# Patient Record
(demographics unavailable — no encounter records)

---

## 2024-12-03 NOTE — PHYSICAL EXAM
[No Acute Distress] : no acute distress [Well Nourished] : well nourished [Well Developed] : well developed [PERRL] : pupils equal round and reactive to light [Thyroid Normal, No Nodules] : the thyroid was normal and there were no nodules present [No Respiratory Distress] : no respiratory distress  [No Accessory Muscle Use] : no accessory muscle use [Clear to Auscultation] : lungs were clear to auscultation bilaterally [Normal Rate] : normal rate  [Regular Rhythm] : with a regular rhythm [Normal S1, S2] : normal S1 and S2 [Soft] : abdomen soft [Grossly Normal Strength/Tone] : grossly normal strength/tone [No Rash] : no rash [Coordination Grossly Intact] : coordination grossly intact [Normal Insight/Judgement] : insight and judgment were intact

## 2024-12-03 NOTE — REVIEW OF SYSTEMS
[Negative] : Heme/Lymph [Headache] : no headache [Dizziness] : dizziness [Fainting] : no fainting [Confusion] : no confusion [Memory Loss] : memory loss [Unsteady Walking] : no ataxia [de-identified] : LE swelling/tingling [de-identified] : forgetulness

## 2024-12-03 NOTE — HEALTH RISK ASSESSMENT
[Very Good] : ~his/her~ current health as very good [Good] : ~his/her~  mood as  good [No] : No [No falls in past year] : Patient reported no falls in the past year [0] : 2) Feeling down, depressed, or hopeless: Not at all (0) [PHQ-2 Negative - No further assessment needed] : PHQ-2 Negative - No further assessment needed [Never] : Never [Patient reported mammogram was normal] : Patient reported mammogram was normal [Patient reported PAP Smear was normal] : Patient reported PAP Smear was normal [None] : None [With Family] : lives with family [Employed] : employed [] :  [# Of Children ___] : has [unfilled] children [Fully functional (bathing, dressing, toileting, transferring, walking, feeding)] : Fully functional (bathing, dressing, toileting, transferring, walking, feeding) [Fully functional (using the telephone, shopping, preparing meals, housekeeping, doing laundry, using] : Fully functional and needs no help or supervision to perform IADLs (using the telephone, shopping, preparing meals, housekeeping, doing laundry, using transportation, managing medications and managing finances) [[id number]] : 0 [Change in mental status noted] : No change in mental status noted [Sexually Active] : not sexually active [Reports changes in hearing] : Reports no changes in hearing [Reports changes in vision] : Reports no changes in vision [MammogramComments] : dense breast tissue 7/23 mammo [PapSmearDate] : 11/23 [ColonoscopyComments] : January upcoming colonscopy

## 2024-12-03 NOTE — HISTORY OF PRESENT ILLNESS
[FreeTextEntry1] : annual [de-identified] : Patient is a 46 year old female who presents for their annual wellness visit. Patient has no complaints of: fever, chills, dizziness, headache, chest pain, palpitations, SOB, N/V/D, urinary symptoms, vomiting/diarrhea.  Patient will intermittently feel heaviness in her legs and has a feeling of pins and needles in her feel. This has been happening about 1x per week with the tingling occuring about once a month. Also has occasional brief episodes of dizziness/vertigo and increased forgetfulness.

## 2024-12-09 NOTE — PHYSICAL EXAM
[Chaperone Present] : A chaperone was present in the examining room during all aspects of the physical examination [[id number]] : A chaperone was present during the pelvic exam. [FreeTextEntry2] : Yudi Henderson [Appropriately responsive] : appropriately responsive [Alert] : alert [No Acute Distress] : no acute distress [No Lymphadenopathy] : no lymphadenopathy [Regular Rate Rhythm] : regular rate rhythm [No Murmurs] : no murmurs [Clear to Auscultation B/L] : clear to auscultation bilaterally [Soft] : soft [Non-tender] : non-tender [Non-distended] : non-distended [No HSM] : No HSM [No Lesions] : no lesions [No Mass] : no mass [Oriented x3] : oriented x3 [Examination Of The Breasts] : a normal appearance [No Masses] : no breast masses were palpable [Labia Majora] : normal [Labia Minora] : normal [Normal] : normal [Uterine Adnexae] : normal

## 2024-12-09 NOTE — PLAN
[FreeTextEntry1] : Examination Pap smear was done. Patient to maintain menstrual calendar. Perimenopause discussed with the patient. Prescription given for mammogram and sonogram Recommended to have transvaginal sonogram.

## 2024-12-09 NOTE — HISTORY OF PRESENT ILLNESS
[Mammogramdate] : 2023 [PapSmeardate] : 2023 [ColonoscopyDate] : 2021 [Regular Cycle Intervals] : periods have been regular [FreeTextEntry1] : 1 week. ago

## 2025-03-25 NOTE — HISTORY OF PRESENT ILLNESS
[FreeTextEntry1] : Follow up  [de-identified] : Patient has had ongoing pressure in her RUQ. Colonoscopy in January - normal per patient. Last week patient felt increased pressure/cramping pain which wrapped around her lower ribcage. She states that it was so uncomfortable that it interrupted her sleep. The pain was deep in nature and did not feel MSK. The pain was consistent for 3-4 days and is now intermittent. She has been focusing on hydrating. This has been ongoing for a few years. Denies change in bowel habits, no fever, N/V/D. Does not feel like the kidney stone she passed in the past. Denies pain or burning with urination.   B/L shoulder pain. Discomfort tinglig and limited ROM. Neck pain with tingling down B/L arms.

## 2025-03-25 NOTE — REVIEW OF SYSTEMS
[Abdominal Pain] : abdominal pain [Nausea] : no nausea [Constipation] : no constipation [Diarrhea] : diarrhea [Vomiting] : no vomiting [Heartburn] : no heartburn [Melena] : no melena [Joint Pain] : joint pain [Negative] : Neurological [FreeTextEntry9] : B/L shoulder pain

## 2025-03-25 NOTE — PHYSICAL EXAM
[No Acute Distress] : no acute distress [Well Nourished] : well nourished [No Respiratory Distress] : no respiratory distress  [No Accessory Muscle Use] : no accessory muscle use [Clear to Auscultation] : lungs were clear to auscultation bilaterally [Normal Rate] : normal rate  [Regular Rhythm] : with a regular rhythm [Normal S1, S2] : normal S1 and S2 [No Edema] : there was no peripheral edema [Soft] : abdomen soft [Non Tender] : non-tender [Non-distended] : non-distended [Normal Bowel Sounds] : normal bowel sounds [No CVA Tenderness] : no CVA  tenderness [Coordination Grossly Intact] : coordination grossly intact [Normal Affect] : the affect was normal [Normal Insight/Judgement] : insight and judgment were intact

## 2025-04-14 NOTE — HISTORY OF PRESENT ILLNESS
[de-identified] : 4/14/25: Date of Injury/Onset: 5 months  Pain: At Rest: 2/10 With Activity: 7/10 Mechanism of injury: Here for b/l shoulder pain x 1-year, worsening symptoms of numbness/tingling x 2 weeks. reports recreated ROM.  Pain in b/l shoulders radiates down arms to hands with N/T into 3rd & 4th digit, 7/10, intermittent, achy and sharp at times.   Worse at night when sleeping. pain awakes patient from sleep. some releif with rest.   Reports hx of RTC tear in left shoulder treated with PT.  denies any medical Hx.  This is NOT a Work Related Injury being treated under Worker's Compensation. This is NOT an athletic injury occurring associated with an interscholastic or organized sports team. Quality of symptoms: ache, radiating Improves with: rest Worse with: ROM Prior treatment: none Prior Imaging: none Reports Available For Review Today: none Out of work/sport: N/A School/Sport/Position/Occupation: Dance teacher/

## 2025-04-14 NOTE — ASSESSMENT
[FreeTextEntry1] : Bilateral X-Ray Examination of the SHOULDER (2 views):  no fractures, subluxations or dislocations.   X-Ray Examination of the SCAPULA 1 or 2 views shows: no significant abnormalities  X-Ray Examination of the CERVICAL SPINE 3 views (or less) shows: straightening consistent with spasm and disc space narrowing.

## 2025-04-14 NOTE — IMAGING
[de-identified] : LEFT SHOULDER  Inspection: No swelling.   Palpation: Tenderness is noted at the bicipital groove, anterior and lateral.   Range of motion: There is pain with range of motion.  , ER 55, @90ER 90, @90IR 30  Strength: There is pain, weakness, and discomfort with strength testing.  Forward Flexion 3/5. Abduction 3/5.  External Rotation 4/5 and Internal Rotation 5-/5   Neurological testings: motor and sensor intact distally.  Ligament Stability and Special Tests:   There is positive arc of pain.   Shoulder apprehension: neg  Shoulder relocation: neg  Obriens test: pos  Biceps Active test: neg  Vu Labral Shear: neg  Impingement testing: pos  Lore testing: pos  Whipple: pos  Cross Body Adduction: neg  RIGHT SHOULDER  Inspection: No swelling.   Palpation: Tenderness is noted at the bicipital groove, anterior and lateral.   Range of motion: There is pain with range of motion.  , ER 55, @90ER 90, @90IR 30  Strength: There is pain and discomfort with strength testing.  Forward Flexion 4/5. Abduction 4/5.  External Rotation 5-/5 and Internal Rotation 5/5   Neurological testings: motor and sensor intact distally.  Ligament Stability and Special Tests:   There is positive arc of pain.   Shoulder apprehension: neg  Shoulder relocation: neg  Obriens test: pos  Biceps Active test: neg  Vu Labral Shear: neg  Impingement testing: pos  Lore testing: pos  Whipple: pos  Cross Body Adduction: neg  NECK:  Inspection: no ecchymosis.   Palpation: trapezial tenderness.   Range of motion:  Full range of motion with mild stiffness . Pain at extremes of rotation to right.   Strength Testing: Weakness with Right Finger Abductors and Grasp  Normal Deltoid, Biceps, Triceps, Wrist Flexors  Neurological testing: light touch is intact throughout both upper extremities  Joya reflex: neg  Spurling test: positive

## 2025-04-14 NOTE — DISCUSSION/SUMMARY
[de-identified] : Left shoulder pain: We will obtain an MRI to evaluate the integrity of the rotator cuff tissue and possible need for surgery, given their continued pain, weakness on exam, positive so testing, and failure to improve with over 6 weeks of conservative treatment.    - The patient was advised of the diagnosis.  The natural history of the pathology was explained to the patient in layman's terms.  Several different treatment options were discussed and explained including the risks and benefits of both surgical and non-surgical treatments.  - We will continue conservative treatment with PT, icing, and anti-inflammatory medication.  - Follow up after MRI to discuss results and further discuss treatment options.  - In the meantime, the patient was advised to apply ice to the area daily, use anti-inflammatory medication, and let pain guide their activities.  Right shoulder: We discussed their diagnosis and treatment options at length. We will first attempt conservative treatment with a course of PT and anti-inflammatory medication. The patient was provided with a prescription to work on scapular strengthening and rotator cuff strengthening on the impingement syndrome protocol. We also discussed the possible of a corticosteroid injection in the future in order to help decrease inflammation and pain so that they can perform better therapy.    Follow up in 6 weeks to re-evaluate progress with therapy